# Patient Record
Sex: FEMALE | URBAN - METROPOLITAN AREA
[De-identification: names, ages, dates, MRNs, and addresses within clinical notes are randomized per-mention and may not be internally consistent; named-entity substitution may affect disease eponyms.]

---

## 2020-06-20 ENCOUNTER — NURSE TRIAGE (OUTPATIENT)
Dept: NURSING | Facility: CLINIC | Age: 20
End: 2020-06-20

## 2020-06-20 NOTE — TELEPHONE ENCOUNTER
"Pt calls in with concern of bite that occurred Wednesday night > Right leg - above knee    Pt thinks it was a mosquito but  says there is a red center - then a white margin - and then what she describes as a \" bulls- eye \" ring     Because of this - advised to have a Provider lay their eyes on   [facility]    Pt says she did NOT see tick    No fever  Feels \"fine\"  Site is itchy - has applied hydrocortisone cream     Protocol and care advice reviewed  Caller states understanding of the recommended disposition    Advised to call back if further questions or concerns    César Price RN / [facility]                Additional Information    Negative: Unresponsive, passed out or very weak    Negative: Difficulty breathing or wheezing    Negative: [1] Hoarseness or cough AND [2] sudden onset following bite    Negative: [1] Difficulty swallowing, drooling or slurred speech AND [2] sudden onset following bite    Negative: Confused thinking or talking    Negative: [1] Life-threatening reaction (anaphylaxis) in the past to same insect bite AND [2] < 2 hours since bite    Negative: Sounds like a life-threatening emergency to the triager    Negative: [1] Caterpillar sting AND [2] systemic symptoms (widespread hives, vomiting, muscle pain, etc)  AND [3] no 911 symptoms    Negative: Child sounds very sick or weak to the triager    Negative: [1] Fever AND [2] spreading red area or streak    Itchy insect bite    Protocols used: INSECT BITE-P-AH      "

## 2021-05-01 ENCOUNTER — HEALTH MAINTENANCE LETTER (OUTPATIENT)
Age: 21
End: 2021-05-01

## 2021-10-10 ENCOUNTER — HEALTH MAINTENANCE LETTER (OUTPATIENT)
Age: 21
End: 2021-10-10

## 2022-05-22 ENCOUNTER — HEALTH MAINTENANCE LETTER (OUTPATIENT)
Age: 22
End: 2022-05-22

## 2022-09-24 ENCOUNTER — HEALTH MAINTENANCE LETTER (OUTPATIENT)
Age: 22
End: 2022-09-24

## 2023-06-04 ENCOUNTER — HEALTH MAINTENANCE LETTER (OUTPATIENT)
Age: 23
End: 2023-06-04